# Patient Record
Sex: MALE | Race: OTHER | NOT HISPANIC OR LATINO | URBAN - METROPOLITAN AREA
[De-identification: names, ages, dates, MRNs, and addresses within clinical notes are randomized per-mention and may not be internally consistent; named-entity substitution may affect disease eponyms.]

---

## 2019-08-03 ENCOUNTER — EMERGENCY (EMERGENCY)
Facility: HOSPITAL | Age: 49
LOS: 1 days | Discharge: HOME | End: 2019-08-03
Attending: EMERGENCY MEDICINE | Admitting: EMERGENCY MEDICINE
Payer: OTHER MISCELLANEOUS

## 2019-08-03 VITALS
SYSTOLIC BLOOD PRESSURE: 150 MMHG | DIASTOLIC BLOOD PRESSURE: 86 MMHG | TEMPERATURE: 99 F | HEART RATE: 93 BPM | WEIGHT: 181 LBS | OXYGEN SATURATION: 98 % | HEIGHT: 71.26 IN | RESPIRATION RATE: 18 BRPM

## 2019-08-03 VITALS
OXYGEN SATURATION: 98 % | TEMPERATURE: 98 F | HEART RATE: 87 BPM | DIASTOLIC BLOOD PRESSURE: 85 MMHG | RESPIRATION RATE: 18 BRPM | SYSTOLIC BLOOD PRESSURE: 145 MMHG
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
## 2019-08-03 DIAGNOSIS — S61.012A LACERATION WITHOUT FOREIGN BODY OF LEFT THUMB WITHOUT DAMAGE TO NAIL, INITIAL ENCOUNTER: ICD-10-CM
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
## 2019-08-03 PROCEDURE — 15240 FTH/GFT F/C/C/M/N/AX/G/H/F20: CPT

## 2019-08-03 PROCEDURE — 96374 THER/PROPH/DIAG INJ IV PUSH: CPT | Mod: XU

## 2019-08-03 PROCEDURE — 99285 EMERGENCY DEPT VISIT HI MDM: CPT | Mod: 25

## 2019-08-03 PROCEDURE — 99284 EMERGENCY DEPT VISIT MOD MDM: CPT

## 2019-08-03 RX ORDER — CEPHALEXIN 500 MG
1 CAPSULE ORAL
Qty: 30 | Refills: 0
Start: 2019-08-03 | End: 2019-08-12

## 2019-08-03 RX ORDER — CEPHALEXIN 500 MG
500 CAPSULE ORAL ONCE
Refills: 0 | Status: COMPLETED | OUTPATIENT
Start: 2019-08-03 | End: 2019-08-03

## 2019-08-03 RX ORDER — MORPHINE SULFATE 50 MG/1
4 CAPSULE, EXTENDED RELEASE ORAL ONCE
Refills: 0 | Status: DISCONTINUED | OUTPATIENT
Start: 2019-08-03 | End: 2019-08-03

## 2019-08-03 RX ADMIN — Medication 500 MILLIGRAM(S): at 20:22

## 2019-08-03 RX ADMIN — MORPHINE SULFATE 4 MILLIGRAM(S): 50 CAPSULE, EXTENDED RELEASE ORAL at 20:16

## 2019-08-03 RX ADMIN — MORPHINE SULFATE 4 MILLIGRAM(S): 50 CAPSULE, EXTENDED RELEASE ORAL at 18:44

## 2019-08-03 NOTE — ED PROVIDER NOTE - OBJECTIVE STATEMENT
50 y/o Male with no PMHx presents to the ED c/o left 1st finger laceration. 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 Pt with active bleeding and pain. Denies numbness and tingling. No daily medication use. 50 y/o Male with no PMHx presents to the ED c/o left 1st finger laceration. 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 Pt with active bleeding and pain. Denies numbness and tingling. No daily medication use.

## 2019-08-03 NOTE — ED ADULT NURSE NOTE - OBJECTIVE STATEMENT
Pt walked into ED with c/o of avulsion of the left thumb with moderate bleeding. Onset was at work while cutting food with a knife. He denies numbness, tingling, fever, chills. He was able to move his thumb and has positive radial pulse. Unaware of last tetanus vaccine. Pt denies taking medication for relief .Pressure applied to the finger. Denies taking blood thinners

## 2019-08-03 NOTE — ED PROVIDER NOTE - CLINICAL SUMMARY MEDICAL DECISION MAKING FREE TEXT BOX
Patient was seen and treated by Dr. Evans from plastics. Recommend abx therapy and f/u with him at his office.

## 2019-08-03 NOTE — ED PROVIDER NOTE - PHYSICAL EXAMINATION
Left thumb + laceration ,+  2cm lateral partial soft tissue avulsion including nail with  injury to nailbed. Minimal skin attachment . Able to flex and extend at DIP , no motor or sensory deficit, NVI. + active bleeding with sever tenderness.

## 2019-08-03 NOTE — ED PROVIDER NOTE - CARE PROVIDER_API CALL
Rosales Evans)  Plastic Surgery  [address] [place] [address]  Phone: [phone]  Fax: [phone]  Follow Up Time:

## 2019-08-03 NOTE — ED PROVIDER NOTE - ATTENDING CONTRIBUTION TO CARE
Addendum to attending statement: I have reviewed the ACP note and agree with the history, exam, and plan of care. I  was available to DONALDO Cisneros as a supervising provider if needed.   50 y/o Male with no PMHx presents to the ED c/o left 1st finger laceration. 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 Pt with active bleeding and pain. Denies numbness and tingling.  Partial fingertip avulsion w/ nailbed injury  Hand / plastics on call Dr Rosales Evans consulted. Dr Evans examined pt and closed wound. Abx, f/u plastic within 1 week
